# Patient Record
Sex: FEMALE | Race: WHITE | NOT HISPANIC OR LATINO | URBAN - NONMETROPOLITAN AREA
[De-identification: names, ages, dates, MRNs, and addresses within clinical notes are randomized per-mention and may not be internally consistent; named-entity substitution may affect disease eponyms.]

---

## 2021-01-05 ENCOUNTER — OFFICE VISIT (OUTPATIENT)
Dept: OTOLARYNGOLOGY | Facility: CLINIC | Age: 36
End: 2021-01-05

## 2021-01-05 VITALS — BODY MASS INDEX: 20.91 KG/M2 | RESPIRATION RATE: 16 BRPM | HEIGHT: 63 IN | WEIGHT: 118 LBS

## 2021-01-05 DIAGNOSIS — K21.9 LARYNGOPHARYNGEAL REFLUX: ICD-10-CM

## 2021-01-05 DIAGNOSIS — K14.8 TONGUE LESION: Primary | ICD-10-CM

## 2021-01-05 PROCEDURE — 99202 OFFICE O/P NEW SF 15 MIN: CPT | Performed by: OTOLARYNGOLOGY

## 2021-01-05 NOTE — PROGRESS NOTES
"YOB: 1985  Location: [facility]  Location Address: [address] [place] [address], [facility], [address] [place] [address]  Location Phone: [phone]    Chief Complaint   Patient presents with   • tongue injury       History of Present Illness  Gia Henning is a 35 y.o. female.  Gia Henning is here for evaluation of ENT complaints. The patient has had problems with tongue complaints  The symptoms are localized to the tip Of the tongue. The patient has had mild symptoms. The symptoms have been present Approximately 8 months The symptoms are aggravated by  Salt, spicy food, acidic food.. There have been no factors that have improved the symptoms.  This began when she put the cord from her breast pump in her mouth see if her child was hurt by doing the same thing when it appeared as if you are shocked when he did this.  She sustained tongue lesion which has improved but not completely healed over the last 8 months.  She has a history of intermittent indigestion and dyspepsia which happen several times a week if not more frequently but she says it is mild and she has not felt like \"treating it\" as she has had it for approximately 8 to 9 years.  She is taking a PPI in the past.    I have personally reviewed the information imported into the chart during this visit.      I have personally reviewed the review of systems.         History reviewed. No pertinent past medical history.    History reviewed. No pertinent surgical history.    No outpatient medications have been marked as taking for the 21 encounter (Office Visit) with Guero Ingram MD.       Patient has no known allergies.    Family History   Problem Relation Age of Onset   • Hypertension Mother    • No Known Problems Father        Social History     Socioeconomic History   • Marital status:      Spouse name: Not on file   • Number of children: Not on file   • Years of education: Not on file   • Highest education level: Not on file "   Tobacco Use   • Smoking status: Never Smoker   • Smokeless tobacco: Never Used   Substance and Sexual Activity   • Alcohol use: Never     Frequency: Never       Review of Systems   Constitutional: Negative.    HENT: Positive for mouth sores.    Eyes: Negative.    Respiratory: Negative.    Cardiovascular: Negative.    Gastrointestinal: Negative.    Endocrine: Negative.    Genitourinary: Negative.    Musculoskeletal: Negative.    Skin: Negative.    Allergic/Immunologic: Negative.    Neurological: Negative.    Hematological: Negative.    Psychiatric/Behavioral: Negative.        Vitals:    01/05/21 1039   Resp: 16       Body mass index is 20.9 kg/m².    Objective     Physical Exam  CONSTITUTIONAL: well nourished, well-developed, alert, oriented, in no acute distress     COMMUNICATION AND VOICE: able to communicate normally, normal voice quality    HEAD: normocephalic, no lesions, atraumatic, no tenderness, no masses     FACE: appearance normal, no lesions, no tenderness, no deformities, facial motion symmetric    EYES: ocular motility normal, eyelids normal, orbits normal, no proptosis, conjunctiva normal , pupils equal, round     EARS:  Hearing: hearing to conversational voice intact bilaterally   External Ears: normal bilaterally, no lesions  TMs    NOSE:  External Nose: external nasal structure normal, no tenderness on palpation, no nasal discharge, no lesions, no evidence of trauma,     ORAL:  Lips: upper and lower lips without lesion   OC/OP-several photos were reviewed of the tip of her tongue which are enlarged.  Small superficial ulcerative mucosally covered lesion on the tip of the tongue was noted approximately 2 x 3 mm in size.    NECK:  Inspection: neck appearance normal    CHEST/RESPIRATORY: normal respiratory effort     CARDIOVASCULAR:     NEUROLOGICAL/PSYCHIATRIC: oriented to time, place and person, mood normal, affect appropriate    Assessment/Plan   Diagnoses and all orders for this visit:    1.  Tongue lesion (Primary)    2. Laryngopharyngeal reflux      * Surgery not found *  No orders of the defined types were placed in this encounter.    Return in about 6 weeks (around 2/16/2021).       Patient Instructions   Call or return for problems  Follow-up in 6 to 8 weeks-he will message me through link 10  We will prescribe Peridex and recommend Pepcid twice daily

## 2021-01-05 NOTE — PATIENT INSTRUCTIONS
Call or return for problems  Follow-up in 6 to 8 weeks-he will message me through link 10  We will prescribe Peridex and recommend Pepcid twice daily

## 2021-01-06 RX ORDER — FAMOTIDINE 20 MG/1
20 TABLET, FILM COATED ORAL 2 TIMES DAILY
Qty: 60 TABLET | Refills: 3 | Status: SHIPPED | OUTPATIENT
Start: 2021-01-06

## 2021-01-06 RX ORDER — CHLORHEXIDINE GLUCONATE 0.12 MG/ML
15 RINSE ORAL 2 TIMES DAILY
Qty: 900 ML | Refills: 1 | Status: SHIPPED | OUTPATIENT
Start: 2021-01-06 | End: 2021-02-05

## 2021-01-12 ENCOUNTER — TELEPHONE (OUTPATIENT)
Dept: SCHEDULING | Age: 36
End: 2021-01-12

## 2021-07-14 ENCOUNTER — TELEPHONE (OUTPATIENT)
Dept: OTOLARYNGOLOGY | Facility: CLINIC | Age: 36
End: 2021-07-14

## 2021-07-14 NOTE — TELEPHONE ENCOUNTER
Left voicemail on pt  phone regarding referral    Called patient to notify her of referral to [facility].  Pts schedule appt is 8/17 @3:30 with Dr. Anna Landry   [address] [facility] [address]   [facility] [address]

## 2022-11-22 ENCOUNTER — APPOINTMENT (OUTPATIENT)
Dept: URBAN - METROPOLITAN AREA SURGERY 13 | Age: 37
Setting detail: DERMATOLOGY
End: 2022-11-22

## 2022-11-22 DIAGNOSIS — L0292 CARBUNCLE AND FURUNCLE OF UNSPECIFIED SITE: ICD-10-CM

## 2022-11-22 DIAGNOSIS — L0293 CARBUNCLE AND FURUNCLE OF UNSPECIFIED SITE: ICD-10-CM

## 2022-11-22 PROBLEM — L02.02 FURUNCLE OF FACE: Status: ACTIVE | Noted: 2022-11-22

## 2022-11-22 PROCEDURE — OTHER PRESCRIPTION MEDICATION MANAGEMENT: OTHER

## 2022-11-22 PROCEDURE — OTHER COUNSELING: OTHER

## 2022-11-22 PROCEDURE — 99203 OFFICE O/P NEW LOW 30 MIN: CPT

## 2022-11-22 PROCEDURE — OTHER PRESCRIPTION: OTHER

## 2022-11-22 PROCEDURE — OTHER ADDITIONAL NOTES: OTHER

## 2022-11-22 RX ORDER — MUPIROCIN 20 MG/G
OINTMENT TOPICAL
Qty: 15 | Refills: 3 | Status: ERX | COMMUNITY
Start: 2022-11-22

## 2022-11-22 RX ORDER — AMOXICILLIN 500 MG/1
TABLET, FILM COATED ORAL
Qty: 14 | Refills: 0 | Status: ERX | COMMUNITY
Start: 2022-11-22

## 2022-11-22 RX ORDER — GENTAMICIN SULFATE 1 MG/G
OINTMENT TOPICAL
Qty: 15 | Refills: 0 | Status: ERX | COMMUNITY
Start: 2022-11-22

## 2022-11-22 ASSESSMENT — LOCATION ZONE DERM: LOCATION ZONE: EAR

## 2022-11-22 ASSESSMENT — LOCATION SIMPLE DESCRIPTION DERM: LOCATION SIMPLE: RIGHT EAR

## 2022-11-22 ASSESSMENT — LOCATION DETAILED DESCRIPTION DERM: LOCATION DETAILED: RIGHT CYMBA CONCHA

## 2022-11-22 NOTE — HPI: EVALUATION OF SKIN LESION(S)
How Severe Are Your Spot(S)?: moderate
Have Your Spot(S) Been Treated In The Past?: has not been treated
Hpi Title: Evaluation of a Skin Lesion
Additional History: History of this about 20 years ago.

## 2022-11-22 NOTE — PROCEDURE: PRESCRIPTION MEDICATION MANAGEMENT
Initiate Treatment: Amoxil 500 mg bid x 7 days. Gentamicin ointment bid x 7 days
Detail Level: Zone
Render In Strict Bullet Format?: No

## 2022-11-22 NOTE — PROCEDURE: ADDITIONAL NOTES
Additional Notes: Pt to contact pediatrician to make sure amoxicillin is safe for breastfeeding
Render Risk Assessment In Note?: no
Detail Level: Simple

## 2024-01-02 ENCOUNTER — APPOINTMENT (OUTPATIENT)
Dept: URBAN - METROPOLITAN AREA SURGERY 13 | Age: 39
Setting detail: DERMATOLOGY
End: 2024-01-03

## 2024-01-02 DIAGNOSIS — H61.03 CHONDRITIS OF EXTERNAL EAR: ICD-10-CM

## 2024-01-02 PROBLEM — H61.032 CHONDRITIS OF LEFT EXTERNAL EAR: Status: ACTIVE | Noted: 2024-01-02

## 2024-01-02 PROCEDURE — 99213 OFFICE O/P EST LOW 20 MIN: CPT

## 2024-01-02 PROCEDURE — OTHER MIPS QUALITY: OTHER

## 2024-01-02 PROCEDURE — OTHER PRESCRIPTION: OTHER

## 2024-01-02 PROCEDURE — OTHER COUNSELING: OTHER

## 2024-01-02 PROCEDURE — OTHER PRESCRIPTION MEDICATION MANAGEMENT: OTHER

## 2024-01-02 RX ORDER — FLUTICASONE PROPIONATE 0.5 MG/G
CREAM TOPICAL
Qty: 30 | Refills: 0 | Status: ERX | COMMUNITY
Start: 2024-01-02

## 2024-01-02 ASSESSMENT — LOCATION DETAILED DESCRIPTION DERM
LOCATION DETAILED: LEFT CYMBA CONCHA
LOCATION DETAILED: LEFT CYMBA CONCHA

## 2024-01-02 ASSESSMENT — LOCATION ZONE DERM
LOCATION ZONE: EAR
LOCATION ZONE: EAR

## 2024-01-02 ASSESSMENT — LOCATION SIMPLE DESCRIPTION DERM
LOCATION SIMPLE: LEFT EAR
LOCATION SIMPLE: LEFT EAR

## 2024-01-02 NOTE — HPI: BODY LOCATION - EAR
How Severe Is Your Condition?: mild
Additional History: Patient reports she went to urgent care on 12/18/23 and  she was RX’D Augmentin with no improvement.

## 2024-01-02 NOTE — PROCEDURE: PRESCRIPTION MEDICATION MANAGEMENT
Render In Strict Bullet Format?: No
Initiate Treatment: Apply fluticasone to aa BID x 2 weeks then discontinue. Sleep with donut pillow daily
Detail Level: Zone
Plan: Recommended Donut pillow to relief pressure. Reassured affected area does not appear to warrant biopsy at this time. Wash hands after application of medication since breastfeeding. If no improvement consider ENT Referral. Reassured there does not appear to be an infection. Follow up 2 weeks